# Patient Record
Sex: MALE | Race: WHITE | HISPANIC OR LATINO | Employment: FULL TIME | URBAN - METROPOLITAN AREA
[De-identification: names, ages, dates, MRNs, and addresses within clinical notes are randomized per-mention and may not be internally consistent; named-entity substitution may affect disease eponyms.]

---

## 2017-02-22 ENCOUNTER — APPOINTMENT (OUTPATIENT)
Dept: RADIOLOGY | Facility: MEDICAL CENTER | Age: 42
End: 2017-02-22
Attending: EMERGENCY MEDICINE
Payer: OTHER MISCELLANEOUS

## 2017-02-22 ENCOUNTER — HOSPITAL ENCOUNTER (EMERGENCY)
Facility: MEDICAL CENTER | Age: 42
End: 2017-02-22
Attending: EMERGENCY MEDICINE
Payer: OTHER MISCELLANEOUS

## 2017-02-22 VITALS
OXYGEN SATURATION: 94 % | WEIGHT: 194.22 LBS | RESPIRATION RATE: 16 BRPM | HEART RATE: 60 BPM | TEMPERATURE: 97.8 F | DIASTOLIC BLOOD PRESSURE: 94 MMHG | SYSTOLIC BLOOD PRESSURE: 136 MMHG

## 2017-02-22 DIAGNOSIS — R20.0 LEFT FACIAL NUMBNESS: ICD-10-CM

## 2017-02-22 DIAGNOSIS — R51.9 ACUTE NONINTRACTABLE HEADACHE, UNSPECIFIED HEADACHE TYPE: ICD-10-CM

## 2017-02-22 DIAGNOSIS — R20.2 NUMBNESS AND TINGLING OF LEFT ARM AND LEG: ICD-10-CM

## 2017-02-22 DIAGNOSIS — R20.0 NUMBNESS AND TINGLING OF LEFT ARM AND LEG: ICD-10-CM

## 2017-02-22 LAB
ABO GROUP BLD: NORMAL
ABO GROUP BLD: NORMAL
ALBUMIN SERPL BCP-MCNC: 3.7 G/DL (ref 3.2–4.9)
ALBUMIN/GLOB SERPL: 1 G/DL
ALP SERPL-CCNC: 67 U/L (ref 30–99)
ALT SERPL-CCNC: 38 U/L (ref 2–50)
ANION GAP SERPL CALC-SCNC: 8 MMOL/L (ref 0–11.9)
APTT PPP: 27.1 SEC (ref 24.7–36)
AST SERPL-CCNC: 29 U/L (ref 12–45)
BASOPHILS # BLD AUTO: 0.6 % (ref 0–1.8)
BASOPHILS # BLD: 0.05 K/UL (ref 0–0.12)
BILIRUB SERPL-MCNC: 0.6 MG/DL (ref 0.1–1.5)
BLD GP AB SCN SERPL QL: NORMAL
BUN SERPL-MCNC: 22 MG/DL (ref 8–22)
CALCIUM SERPL-MCNC: 8.7 MG/DL (ref 8.4–10.2)
CHLORIDE SERPL-SCNC: 106 MMOL/L (ref 96–112)
CO2 SERPL-SCNC: 23 MMOL/L (ref 20–33)
CREAT SERPL-MCNC: 0.81 MG/DL (ref 0.5–1.4)
EKG IMPRESSION: NORMAL
EOSINOPHIL # BLD AUTO: 0.18 K/UL (ref 0–0.51)
EOSINOPHIL NFR BLD: 2.3 % (ref 0–6.9)
ERYTHROCYTE [DISTWIDTH] IN BLOOD BY AUTOMATED COUNT: 40 FL (ref 35.9–50)
GFR SERPL CREATININE-BSD FRML MDRD: >60 ML/MIN/1.73 M 2
GLOBULIN SER CALC-MCNC: 3.6 G/DL (ref 1.9–3.5)
GLUCOSE SERPL-MCNC: 105 MG/DL (ref 65–99)
HCT VFR BLD AUTO: 45.4 % (ref 42–52)
HGB BLD-MCNC: 15.9 G/DL (ref 14–18)
IMM GRANULOCYTES # BLD AUTO: 0.03 K/UL (ref 0–0.11)
IMM GRANULOCYTES NFR BLD AUTO: 0.4 % (ref 0–0.9)
INR PPP: 0.98 (ref 0.87–1.13)
LYMPHOCYTES # BLD AUTO: 2.19 K/UL (ref 1–4.8)
LYMPHOCYTES NFR BLD: 27.9 % (ref 22–41)
MCH RBC QN AUTO: 30.8 PG (ref 27–33)
MCHC RBC AUTO-ENTMCNC: 35 G/DL (ref 33.7–35.3)
MCV RBC AUTO: 87.8 FL (ref 81.4–97.8)
MONOCYTES # BLD AUTO: 0.64 K/UL (ref 0–0.85)
MONOCYTES NFR BLD AUTO: 8.1 % (ref 0–13.4)
NEUTROPHILS # BLD AUTO: 4.77 K/UL (ref 1.82–7.42)
NEUTROPHILS NFR BLD: 60.7 % (ref 44–72)
NRBC # BLD AUTO: 0 K/UL
NRBC BLD AUTO-RTO: 0 /100 WBC
PLATELET # BLD AUTO: 245 K/UL (ref 164–446)
PMV BLD AUTO: 9 FL (ref 9–12.9)
POTASSIUM SERPL-SCNC: 3.5 MMOL/L (ref 3.6–5.5)
PROT SERPL-MCNC: 7.3 G/DL (ref 6–8.2)
PROTHROMBIN TIME: 12.8 SEC (ref 12–14.6)
RBC # BLD AUTO: 5.17 M/UL (ref 4.7–6.1)
RH BLD: NORMAL
SODIUM SERPL-SCNC: 137 MMOL/L (ref 135–145)
TROPONIN I SERPL-MCNC: <0.02 NG/ML (ref 0–0.04)
WBC # BLD AUTO: 7.9 K/UL (ref 4.8–10.8)

## 2017-02-22 PROCEDURE — 93005 ELECTROCARDIOGRAM TRACING: CPT

## 2017-02-22 PROCEDURE — 71010 DX-CHEST-PORTABLE (1 VIEW): CPT

## 2017-02-22 PROCEDURE — 70450 CT HEAD/BRAIN W/O DYE: CPT

## 2017-02-22 PROCEDURE — 80053 COMPREHEN METABOLIC PANEL: CPT

## 2017-02-22 PROCEDURE — 70498 CT ANGIOGRAPHY NECK: CPT

## 2017-02-22 PROCEDURE — 93005 ELECTROCARDIOGRAM TRACING: CPT | Performed by: EMERGENCY MEDICINE

## 2017-02-22 PROCEDURE — 99284 EMERGENCY DEPT VISIT MOD MDM: CPT

## 2017-02-22 PROCEDURE — 84484 ASSAY OF TROPONIN QUANT: CPT

## 2017-02-22 PROCEDURE — 70496 CT ANGIOGRAPHY HEAD: CPT

## 2017-02-22 PROCEDURE — 700117 HCHG RX CONTRAST REV CODE 255: Performed by: EMERGENCY MEDICINE

## 2017-02-22 PROCEDURE — 85730 THROMBOPLASTIN TIME PARTIAL: CPT

## 2017-02-22 PROCEDURE — 36415 COLL VENOUS BLD VENIPUNCTURE: CPT

## 2017-02-22 PROCEDURE — 86901 BLOOD TYPING SEROLOGIC RH(D): CPT

## 2017-02-22 PROCEDURE — 94760 N-INVAS EAR/PLS OXIMETRY 1: CPT

## 2017-02-22 PROCEDURE — 86900 BLOOD TYPING SEROLOGIC ABO: CPT

## 2017-02-22 PROCEDURE — 85610 PROTHROMBIN TIME: CPT

## 2017-02-22 PROCEDURE — 86850 RBC ANTIBODY SCREEN: CPT

## 2017-02-22 PROCEDURE — 85025 COMPLETE CBC W/AUTO DIFF WBC: CPT

## 2017-02-22 RX ORDER — BUTALBITAL, ACETAMINOPHEN, CAFFEINE AND CODEINE PHOSPHATE 50; 325; 40; 30 MG/1; MG/1; MG/1; MG/1
1 CAPSULE ORAL EVERY 4 HOURS PRN
Qty: 30 CAP | Refills: 0 | Status: SHIPPED | OUTPATIENT
Start: 2017-02-22

## 2017-02-22 RX ADMIN — IOHEXOL 100 ML: 350 INJECTION, SOLUTION INTRAVENOUS at 19:32

## 2017-02-22 ASSESSMENT — PAIN SCALES - GENERAL: PAINLEVEL_OUTOF10: 0

## 2017-02-22 ASSESSMENT — PAIN SCALES - WONG BAKER: WONGBAKER_NUMERICALRESPONSE: HURTS A LITTLE MORE

## 2017-02-22 NOTE — ED AVS SNAPSHOT
Home Care Instructions                                                                                                                Krishna Goodman   MRN: [id number]    Department:  [facility], Emergency Dept   Date of Visit:  2/22/2017            [facility], Emergency Dept    [address]    [place] [address]    Phone:  [phone]      You were seen by     Bobbi Ying M.D.      Your Diagnosis Was     Numbness and tingling of left arm and leg     R20.2       These are the medications you received during your hospitalization from 02/22/2017 1800 to 02/22/2017 2003     Date/Time Order Dose Route Action    02/22/2017 1932 iohexol (OMNIPAQUE) 350 mg/mL 100 mL Intravenous Given      Follow-up Information     1. Follow up with [facility]. Call in 1 day.    Why:  for recheck    Contact information    [address] [facility] [address] [phone]          2. Follow up with [facility], Emergency Dept. Call in 1 day.    Specialty:  Emergency Medicine    Why:  for recheck    Contact information    [address] [facility] [address] [phone]      Medication Information     Review all of your home medications and newly ordered medications with your primary doctor and/or pharmacist as soon as possible. Follow medication instructions as directed by your doctor and/or pharmacist.     Please keep your complete medication list with you and share with your physician. Update the information when medications are discontinued, doses are changed, or new medications (including over-the-counter products) are added; and carry medication information at all times in the event of emergency situations.               Medication List      START taking these medications        Instructions    butalbital-acetaminophen-caffeine-codeine -50-30 MG per capsule   Commonly known as:  FIORICET W/CODEINE    Take 1 Cap by  mouth every four hours as needed for Headache.   Dose:  1 Cap               Procedures and tests performed during your visit     ABO CONFIRMATION    APTT    CARDIAC MONITORING    CBC WITH DIFFERENTIAL    COD (ADULT)    COMP METABOLIC PANEL    CONSENT FOR CONTRAST INJECTION    CT-CTA HEAD WITH & W/O-POST PROCESS    CT-CTA NECK WITH & W/O-POST PROCESSING    CT-HEAD W/O    DX-CHEST-PORTABLE (1 VIEW)    EKG (ER)    EKG (NOW)    ESTIMATED GFR    IV SALINE LOCK    Oxygen    PROTHROMBIN TIME    Pulse Ox    TROPONIN        Discharge Instructions       Dolor de rainer general sin causa   (General Headache Without Cause)    EL dolor de rainer es un dolor o malestar que se siente en la meet de la rainer o del oz. Puede no tener mercedes causa específica. Hay muchas causas y tipos de luca de rainer. Los más comunes son:   · Cefalea tensional.  · Cefaleas migrañosas.  · Cefalea en brotes.  · Cefaleas diarias crónicas.  INSTRUCCIONES PARA EL CUIDADO EN EL HOGAR   · Cumpla con todas las citas programadas con arango médico o con el especialista al que lo hayan derivado.  · Sólo tome medicamentos de venta vamshi o recetados para calmar el dolor o el malestar, según las indicaciones de arango médico.  · Cuando sienta dolor de rainer acuéstese en un cuarto oscuro y tranquilo.  · Lleve un registro diario para averiguar lo que puede provocarlo. Por ejemplo, escriba:  · Lo que come y morris.  · Cuánto tiempo duerme.  · Todo cambio en la dieta o medicamentos.  · Intente con masajes u otras técnicas de relajación.  · Colóquese compresas de hielo o calor en la rainer y en el oz. Úselos 3 a 4 veces por día de 15 a 20 minutos por vez, o iesha sea necesario.  · Limite las situaciones de estrés.  · Siéntese con la espalda recta y no  tense los músculos.  · Si fuma, deje de hacerlo.  · Limite el consumo de bebidas alcohólicas.  · Consuma menos cantidad de cafeína o deje de tomarla.  · Coma y duerma en horarios regulares.  · Duerma entre 7 y 9 horas o  iesha lo indique arango médico.  · Mantenga las luces tenues si le molestan las luces brillantes y empeoran el dolor de rainer.  SOLICITE ATENCIÓN MÉDICA SI:   · Tiene problemas con los medicamentos que le recetaron.  · Los medicamentos no le hacen efecto.  · El dolor de rainer que sentía habitualmente es diferente.  · Tiene náuseas o vómitos.  SOLICITE ATENCIÓN MÉDICA DE INMEDIATO SI:   · El dolor se hace cada vez más intenso.  · Tiene fiebre.  · Presenta rigidez en el oz.  · Sufre pérdida de la visión.  · Presenta debilidad muscular o pérdida del control muscular.  · Comienza a perder el equilibrio o tiene problemas para caminar.  · Sufre mareos o se desmaya.  · Tiene síntomas graves que son diferentes a los primeros síntomas.     Esta información no tiene iesha fin reemplazar el consejo del médico. Asegúrese de hacerle al médico cualquier pregunta que tenga.     Document Released: 09/27/2006 Document Revised: 05/03/2016  [employer] Interactive Patient Education ©2016 [employer]    Cefalea migrañosa  (Migraine Headache)  Vikki cefalea migrañosa es un dolor muy intenso y punzante en bertram o ambos lados de la rainer. Hable con arango médico sobre los factores que pueden causar (desencadenar) las cefaleas migrañosas.  CUIDADOS EN EL HOGAR  · [place] solo los medicamentos según le haya indicado el médico.  · Cuando tenga la migraña, acuéstese en un cuarto oscuro y tranquilo  · Lleve un registro diario para averiguar si hay ciertas cosas que le provocan la cefalea migrañosa. Por ejemplo, escriba:  ¨ Lo que usted come y morris.  ¨ Cuánto tiempo duerme.  ¨ Algún cambio en arango dieta o en los medicamentos.  · Mojgan menos alcohol.  · Si fuma, deje de hacerlo.  · Duerma lo suficiente.  · Disminuya todo tipo de estrés de la katie diaria.  · Mantenga las luces tenues si le molestan las luces brillantes o hacen que la migraña empeore.  SOLICITE AYUDA DE INMEDIATO SI:   · La migraña empeora.  · Tiene fiebre.  · Presenta rigidez en el  oz.  · Tiene dificultad para kim.  · Roxana músculos están débiles, o pierde el control muscular.  · Pierde el equilibrio o tiene problemas para caminar.  · Siente que se desvanece (debilidad) o se desmaya.  · Tiene malos síntomas que son diferentes a los primeros síntomas.  ASEGÚRESE DE QUE:   · Comprende estas instrucciones.  · Controlará arango afección.  · Recibirá ayuda de inmediato si no mejora o si empeora.     Esta información no tiene iesha fin reemplazar el consejo del médico. Asegúrese de hacerle al médico cualquier pregunta que tenga.     Document Released: 03/16/2010 Document Revised: 12/23/2014  [employer] Interactive Patient Education ©2016 [employer] Inc.            Patient Information     Patient Information    Following emergency treatment: all patient requiring follow-up care must return either to a private physician or a clinic if your condition worsens before you are able to obtain further medical attention, please return to the emergency room.     Billing Information    At [facility], we work to make the billing process streamlined for our patients.  Our Representatives are here to answer any questions you may have regarding your hospital bill.  If you have insurance coverage and have supplied your insurance information to us, we will submit a claim to your insurer on your behalf.  Should you have any questions regarding your bill, we can be reached online or by phone as follows:  Online: You are able pay your bills online or live chat with our representatives about any billing questions you may have. We are here to help Monday - Friday from 8:00am to 7:30pm and 9:00am - 12:00pm on Saturdays.  Please visit [email or web address]  for more information.   Phone:  [phone] or [phone]    Please note that your emergency physician, surgeon, pathologist, radiologist, anesthesiologist, and other specialists are not employed by [facility] and will therefore bill  separately for their services.  Please contact them directly for any questions concerning their bills at the numbers below:     Emergency Physician Services:  [phone]  [facility]:  [phone]  [facility]:  [phone]  [facility]:  [phone]    1. Your final bill may vary from the amount quoted upon discharge if all procedures are not complete at that time, or if your doctor has additional procedures of which we are not aware. You will receive an additional bill if you return to the Emergency Department at [facility] for suture removal regardless of the facility of which the sutures were placed.     2. Please arrange for settlement of this account at the emergency registration.    3. All self-pay accounts are due in full at the time of treatment.  If you are unable to meet this obligation then payment is expected within 4-5 days.     4. If you have had radiology studies (CT, X-ray, Ultrasound, MRI), you have received a preliminary result during your emergency department visit. Please contact the radiology department [phone] to receive a copy of your final result. Please discuss the Final result with your primary physician or with the follow up physician provided.     Crisis Hotline:  [place] Crisis Hotline:  6-234-KNRYSGS or 1-856.784.5380  [place] Crisis Hotline:    1-405.502.5311 or 147-306-7022         ED Discharge Follow Up Questions    1. In order to provide you with very good care, we would like to follow up with a phone call in the next few days.  May we have your permission to contact you?     YES /  NO    2. What is the best phone number to call you? (       )_____-__________    3. What is the best time to call you?      Morning  /  Afternoon  /  Evening                   Patient Signature:  ____________________________________________________________    Date:  ____________________________________________________________

## 2017-02-22 NOTE — ED AVS SNAPSHOT
SimpleTuition Access Code: [email or web address]  Expires: 3/24/2017  8:03 PM    Your email address is not on file at [address].  Email Addresses are required for you to sign up for SimpleTuition, please contact [phone] to verify your personal information and to provide your email address prior to attempting to register for SimpleTuition.    Krishna EricJustyna  [address] [place] [address]    SimpleTuition  A secure, online tool to manage your health information     [facility] [address]’s SimpleTuition® is a secure, online tool that connects you to your personalized health information from the privacy of your home -- day or night - making it very easy for you to manage your healthcare. Once the activation process is completed, you can even access your medical information using the SimpleTuition brandon, which is available for free in the Apple [place] store or Google Play store.     To learn more about SimpleTuition, visit [email or web address]    There are two levels of access available (as shown below):   My Chart Features  [facility] Primary Care Doctor [facility]  Specialists [facility]  Urgent  Care Non-[facility] Primary Care Doctor   Email your healthcare team securely and privately 24/7 X X X    Manage appointments: schedule your next appointment; view details of past/upcoming appointments X      Request prescription refills. X      View recent personal medical records, including lab and immunizations X X X X   View health record, including health history, allergies, medications X X X X   Read reports about your outpatient visits, procedures, consult and ER notes X X X X   See your discharge summary, which is a recap of your hospital and/or ER visit that includes your diagnosis, lab results, and care plan X X  X     How to register for VoterTidet:  Once your e-mail address has been verified, follow the following steps to sign up for VoterTidet.     1. Go to  [email or web address]  2. Click on the Sign Up Now box, which takes you to the New Member Sign Up page. You  will need to provide the following information:  a. Enter your Depop Access Code exactly as it appears at the top of this page. (You will not need to use this code after you’ve completed the sign-up process. If you do not sign up before the expiration date, you must request a new code.)   b. Enter your date of birth.   c. Enter your home email address.   d. Click Submit, and follow the next screen’s instructions.  3. Create a VoloMetrixt ID. This will be your Depop login ID and cannot be changed, so think of one that is secure and easy to remember.  4. Create a Depop password. You can change your password at any time.  5. Enter your Password Reset Question and Answer. This can be used at a later time if you forget your password.   6. Enter your e-mail address. This allows you to receive e-mail notifications when new information is available in Depop.  7. Click Sign Up. You can now view your health information.    For assistance activating your Depop account, call [phone]

## 2017-02-22 NOTE — ED AVS SNAPSHOT
2/22/2017          Krishna Goodman  [address] [facility] [address]    Dear Krishna:    [facility] wants to ensure your discharge home is safe and you or your loved ones have had all your questions answered regarding your care after you leave the hospital.    You may receive a telephone call within two days of your discharge.  This call is to make certain you understand your discharge instructions as well as ensure we provided you with the best care possible during your stay with us.     The call will only last approximately 3-5 minutes and will be done by a nurse.    Once again, we want to ensure your discharge home is safe and that you have a clear understanding of any next steps in your care.  If you have any questions or concerns, please do not hesitate to contact us, we are here for you.  Thank you for choosing [facility] for your healthcare needs.    Sincerely,    Gerson García    [facility]

## 2017-02-23 NOTE — ED NOTES
Discharged in good condition after discharge instructions reviewed with pt in detail, pt verbalizes understanding of all. Ambulated out with steady gait accompanied by family with follow up instructions in hand. Instructed pt to not drive or drink alcohol while taking fioricet, pt states understanding.

## 2017-02-23 NOTE — ED PROVIDER NOTES
ED Provider Note  CHIEF COMPLAINT  Chief Complaint   Patient presents with   • Chest Pain     since Sunday   • Tingling     Left side started Sunday       HPI  Krishna Goodman is a 41 y.o. male who presents in the care of his daughters complaining of left-sided tingling to his face, arm and leg that started on Sunday and has continued throughout the last few days. He states that today he had increased pain in the back of his head as well as the numbness. He is also feeling very weak and dizzy and had a little bit of chest pain as well. He denies any shortness of breath, productive cough, nausea or vomiting. He denies any leg swelling. He has not had any slurred speech. According to his daughters, and he has not had any difficult with walking or driving. He has never had symptoms like this in the past. He denies any falls or head trauma. Nothing seems to make his symptoms better or worse. He denies any double vision. He has no fevers, pain or sinus pressure. He denies any neck stiffness. The patient has no history of hypertension. He does smoke occasionally. He is not diabetic. He has never had any surgery. Does not take medications on a daily basis.    REVIEW OF SYSTEMS     HEENT:  No ear pain, congestion or sore throat   EYES: no discharge redness or vision changes  CARDIAC: Positive chest pain, no palpitations    PULMONARY: no dyspnea, cough or congestion   GI: no vomiting diarrhea or abdominal pain   : no dysuria, back pain or hematuria   Neuro: See history of present illness  Musculoskeletal: no swelling deformity or pain no joint swelling  Endocrine: no fevers, sweating, weight loss   SKIN: no rash, erythema or contusions     See history of present illness all other systems are negative      PAST MEDICAL HISTORY  History reviewed. No pertinent past medical history.    FAMILY HISTORY  History reviewed. No pertinent family history.    SOCIAL HISTORY  Social History     Social History   • Marital Status:       Spouse Name: N/A   • Number of Children: N/A   • Years of Education: N/A     Social History Main Topics   • Smoking status: Current Some Day Smoker   • Smokeless tobacco: None   • Alcohol Use: Yes   • Drug Use: No   • Sexual Activity: Not Asked     Other Topics Concern   • None     Social History Narrative   • None       SURGICAL HISTORY  History reviewed. No pertinent past surgical history.    CURRENT MEDICATIONS  No current facility-administered medications on file prior to encounter.     No current outpatient prescriptions on file prior to encounter.       ALLERGIES  No Known Allergies    PHYSICAL EXAM  VITAL SIGNS: /94 mmHg  Pulse 60  Temp(Src) 36.6 °C (97.8 °F)  Resp 16  Wt 88.1 kg (194 lb 3.6 oz)  SpO2 94% Room air O2: 93    Constitutional: Well developed, Well nourished, No acute distress, Non-toxic appearance.   HENT: Cephalic, atraumatic. Positive left-sided facial numbness without any weakness. No drooling or trismus. No flattening of the nasolabial fold  Eyes: Pupils are equal, round, reactive to light. Extra muscles are intact  Neck: Normal range of motion, No tenderness, Supple, No stridor.   Cardiovascular: Normal heart rate, Normal rhythm, No murmurs, No rubs, No gallops.   Thorax & Lungs: Normal breath sounds, No respiratory distress, No wheezing, No chest tenderness.   Abdomen: Bowel sounds normal, Soft, No tenderness, No masses, No pulsatile masses.   Skin: Warm, Dry, No erythema, No rash.   Back: No tenderness, No CVA tenderness.   Extremities: Intact distal pulses, No edema, No tenderness, No cyanosis, No clubbing.   Neurologic: Positive decreased sensation on the left face, arm and leg. 5 out of 5 strength upper and lower extremities bilaterally. Normal speech  Psychiatric: Affect normal, Judgment normal, Mood normal.     EKG Interpretation    Interpreted by me    Rhythm: normal sinus   Rate: normal  Axis: normal  Ectopy: none  Conduction: normal  ST Segments: no acute  change  T Waves: no acute change  Q Waves: none    Clinical Impression: no acute changes and normal EKG    RADIOLOGY/PROCEDURES  CT-CTA HEAD WITH & W/O-POST PROCESS   Final Result      No intracranial aneurysm, focal stenosis, or abrupt vessel cut off.      CT-CTA NECK WITH & W/O-POST PROCESSING   Final Result      CT angiogram of the neck within normal limits.      DX-CHEST-PORTABLE (1 VIEW)   Final Result         No acute cardiac or pulmonary abnormality is identified.      CT-HEAD W/O   Final Result         NO ACUTE ABNORMALITIES ARE NOTED ON CT SCAN OF THE HEAD.               COURSE & MEDICAL DECISION MAKING  Pertinent Labs & Imaging studies reviewed. (See chart for details)  Differential diagnosis: CVA, TIA, aneurysm, electrolyte disturbance    Results for orders placed or performed during the hospital encounter of 02/22/17   CBC WITH DIFFERENTIAL   Result Value Ref Range    WBC 7.9 4.8 - 10.8 K/uL    RBC 5.17 4.70 - 6.10 M/uL    Hemoglobin 15.9 14.0 - 18.0 g/dL    Hematocrit 45.4 42.0 - 52.0 %    MCV 87.8 81.4 - 97.8 fL    MCH 30.8 27.0 - 33.0 pg    MCHC 35.0 33.7 - 35.3 g/dL    RDW 40.0 35.9 - 50.0 fL    Platelet Count 245 164 - 446 K/uL    MPV 9.0 9.0 - 12.9 fL    Neutrophils-Polys 60.70 44.00 - 72.00 %    Lymphocytes 27.90 22.00 - 41.00 %    Monocytes 8.10 0.00 - 13.40 %    Eosinophils 2.30 0.00 - 6.90 %    Basophils 0.60 0.00 - 1.80 %    Immature Granulocytes 0.40 0.00 - 0.90 %    Nucleated RBC 0.00 /100 WBC    Neutrophils (Absolute) 4.77 1.82 - 7.42 K/uL    Lymphs (Absolute) 2.19 1.00 - 4.80 K/uL    Monos (Absolute) 0.64 0.00 - 0.85 K/uL    Eos (Absolute) 0.18 0.00 - 0.51 K/uL    Baso (Absolute) 0.05 0.00 - 0.12 K/uL    Immature Granulocytes (abs) 0.03 0.00 - 0.11 K/uL    NRBC (Absolute) 0.00 K/uL   COMP METABOLIC PANEL   Result Value Ref Range    Sodium 137 135 - 145 mmol/L    Potassium 3.5 (L) 3.6 - 5.5 mmol/L    Chloride 106 96 - 112 mmol/L    Co2 23 20 - 33 mmol/L    Anion Gap 8.0 0.0 - 11.9     Glucose 105 (H) 65 - 99 mg/dL    Bun 22 8 - 22 mg/dL    Creatinine 0.81 0.50 - 1.40 mg/dL    Calcium 8.7 8.4 - 10.2 mg/dL    AST(SGOT) 29 12 - 45 U/L    ALT(SGPT) 38 2 - 50 U/L    Alkaline Phosphatase 67 30 - 99 U/L    Total Bilirubin 0.6 0.1 - 1.5 mg/dL    Albumin 3.7 3.2 - 4.9 g/dL    Total Protein 7.3 6.0 - 8.2 g/dL    Globulin 3.6 (H) 1.9 - 3.5 g/dL    A-G Ratio 1.0 g/dL   PROTHROMBIN TIME   Result Value Ref Range    PT 12.8 12.0 - 14.6 sec    INR 0.98 0.87 - 1.13   APTT   Result Value Ref Range    APTT 27.1 24.7 - 36.0 sec   COD (ADULT)   Result Value Ref Range    ABO Grouping Only O     Rh Grouping Only POS     Antibody Screen-Cod NEG    TROPONIN   Result Value Ref Range    Troponin I <0.02 0.00 - 0.04 ng/mL   ESTIMATED GFR   Result Value Ref Range    GFR If African American >60 >60 mL/min/1.73 m 2    GFR If Non African American >60 >60 mL/min/1.73 m 2   ABO CONFIRMATION   Result Value Ref Range    Second ABO Typing With Cod O    EKG (ER)   Result Value Ref Range    Report       [facility] Emergency Dept.    Test Date:  2017  Pt Name:    JOSH WHALEN      Department: [facility]  MRN:        [id number]                      Room:  Gender:     M                            Technician: INOCENCIO  :        1975                   Requested By:ER TRIAGE PROTOCOL  Order #:    [id number]                    Reading MD:    Measurements  Intervals                                Axis  Rate:       71                           P:          39  AR:         160                          QRS:        17  QRSD:       88                           T:          13  QT:         380  QTc:        413    Interpretive Statements  SINUS RHYTHM  No previous ECG available for comparison            7:50 PM he spoke with the patient and family informed him that so far his workup was negative. The patient states he does have a primary care physician to follow up with and does not want to stay in the hospital for  further workup. He is to rest and keep her follow-up appointment with his primary care physician for recheck. He understands to return to [facility] for any weakness, trouble with speech, falls or worsening symptoms.    [facility]  [address] [facility] [address]  [phone]    Call in 1 day  for recheck    [facility], Emergency Dept  [address] [facility] [address] [phone]  Call in 1 day  for recheck    Current Outpatient Prescriptions   Medication Sig Dispense Refill   • butalbital-acetaminophen-caffeine-codeine (FIORICET W/CODEINE) -50-30 MG per capsule Take 1 Cap by mouth every four hours as needed for Headache. 30 Cap 0           FINAL IMPRESSION  1. Numbness and tingling of left arm and leg    2. Left facial numbness    3. Acute nonintractable headache, unspecified headache type            Electronically signed by: Bobbi Ying, 2/22/2017 6:49 PM

## 2017-02-23 NOTE — ED NOTES
1830 Assessment done  1845 NSL initiated Blood to lab  1855 2nd ABO sent POC discussed with pt and family  1902 To CT scan

## 2017-02-23 NOTE — DISCHARGE INSTRUCTIONS
Dolor de rainer general sin causa   (General Headache Without Cause)    EL dolor de rainer es un dolor o malestar que se siente en la meet de la rainer o del oz. Puede no tener mercedes causa específica. Hay muchas causas y tipos de luca de rainer. Los más comunes son:   · Cefalea tensional.  · Cefaleas migrañosas.  · Cefalea en brotes.  · Cefaleas diarias crónicas.  INSTRUCCIONES PARA EL CUIDADO EN EL HOGAR   · Cumpla con todas las citas programadas con arango médico o con el especialista al que lo hayan derivado.  · Sólo tome medicamentos de venta vamshi o recetados para calmar el dolor o el malestar, según las indicaciones de arango médico.  · Cuando sienta dolor de rainer acuéstese en un cuarto oscuro y tranquilo.  · Lleve un registro diario para averiguar lo que puede provocarlo. Por ejemplo, escriba:  · Lo que come y morris.  · Cuánto tiempo duerme.  · Todo cambio en la dieta o medicamentos.  · Intente con masajes u otras técnicas de relajación.  · Colóquese compresas de hielo o calor en la rainer y en el oz. Úselos 3 a 4 veces por día de 15 a 20 minutos por vez, o iesha sea necesario.  · Limite las situaciones de estrés.  · Siéntese con la espalda recta y no  tense los músculos.  · Si fuma, deje de hacerlo.  · Limite el consumo de bebidas alcohólicas.  · Consuma menos cantidad de cafeína o deje de tomarla.  · Coma y duerma en horarios regulares.  · Duerma entre 7 y 9 horas o iesha lo indique arango médico.  · Mantenga las luces tenues si le molestan las luces brillantes y empeoran el dolor de rainer.  SOLICITE ATENCIÓN MÉDICA SI:   · Tiene problemas con los medicamentos que le recetaron.  · Los medicamentos no le hacen efecto.  · El dolor de rainer que sentía habitualmente es diferente.  · Tiene náuseas o vómitos.  SOLICITE ATENCIÓN MÉDICA DE INMEDIATO SI:   · El dolor se hace cada vez más intenso.  · Tiene fiebre.  · Presenta rigidez en el oz.  · Sufre pérdida de la visión.  · Presenta debilidad muscular o pérdida  del control muscular.  · Comienza a perder el equilibrio o tiene problemas para caminar.  · Sufre mareos o se desmaya.  · Tiene síntomas graves que son diferentes a los primeros síntomas.     Esta información no tiene iesha fin reemplazar el consejo del médico. Asegúrese de hacerle al médico cualquier pregunta que tenga.     Document Released: 09/27/2006 Document Revised: 05/03/2016  [employer] Patient Education ©2016 Elsevier Inc.    Cefalea migrañosa  (Migraine Headache)  Vikki cefalea migrañosa es un dolor muy intenso y punzante en bertram o ambos lados de la rainer. Hable con arango médico sobre los factores que pueden causar (desencadenar) las cefaleas migrañosas.  CUIDADOS EN EL HOGAR  · [place] solo los medicamentos según le haya indicado el médico.  · Cuando tenga la migraña, acuéstese en un cuarto oscuro y tranquilo  · Lleve un registro diario para averiguar si hay ciertas cosas que le provocan la cefalea migrañosa. Por ejemplo, escriba:  ¨ Lo que usted come y morirs.  ¨ Cuánto tiempo duerme.  ¨ Algún cambio en arango dieta o en los medicamentos.  · Mojgan menos alcohol.  · Si fuma, deje de hacerlo.  · Duerma lo suficiente.  · Disminuya todo tipo de estrés de la katie diaria.  · Mantenga las luces tenues si le molestan las luces brillantes o hacen que la migraña empeore.  SOLICITE AYUDA DE INMEDIATO SI:   · La migraña empeora.  · Tiene fiebre.  · Presenta rigidez en el oz.  · Tiene dificultad para kim.  · Roxana músculos están débiles, o pierde el control muscular.  · Pierde el equilibrio o tiene problemas para caminar.  · Siente que se desvanece (debilidad) o se desmaya.  · Tiene malos síntomas que son diferentes a los primeros síntomas.  ASEGÚRESE DE QUE:   · Comprende estas instrucciones.  · Controlará arango afección.  · Recibirá ayuda de inmediato si no mejora o si empeora.     Esta información no tiene iesha fin reemplazar el consejo del médico. Asegúrese de hacerle al médico cualquier pregunta que tenga.     Document  Released: 03/16/2010 Document Revised: 12/23/2014  Elsevier Interactive Patient Education ©2016 Elsevier Inc.

## 2017-02-23 NOTE — ED NOTES
Chief Complaint   Patient presents with   • Chest Pain     since Sunday   • Tingling     Left side started Sunday     /84 mmHg  Pulse 70  Temp(Src) 36.9 °C (98.5 °F)  Resp 16  Wt 88.1 kg (194 lb 3.6 oz)  SpO2 93%    Pt A&O x 4, no facial droop noted.   equal and strong

## 2017-03-07 LAB — EKG IMPRESSION: NORMAL

## 2017-05-08 ENCOUNTER — OFFICE VISIT (OUTPATIENT)
Dept: CARDIOLOGY | Facility: MEDICAL CENTER | Age: 42
End: 2017-05-08
Payer: OTHER MISCELLANEOUS

## 2017-05-08 VITALS
WEIGHT: 191 LBS | SYSTOLIC BLOOD PRESSURE: 100 MMHG | OXYGEN SATURATION: 97 % | DIASTOLIC BLOOD PRESSURE: 60 MMHG | HEART RATE: 56 BPM

## 2017-05-08 DIAGNOSIS — Z72.0 TOBACCO ABUSE: ICD-10-CM

## 2017-05-08 DIAGNOSIS — R07.9 CHEST PAIN, UNSPECIFIED TYPE: ICD-10-CM

## 2017-05-08 LAB — EKG IMPRESSION: NORMAL

## 2017-05-08 PROCEDURE — 93018 CV STRESS TEST I&R ONLY: CPT | Performed by: INTERNAL MEDICINE

## 2017-05-08 PROCEDURE — 93000 ELECTROCARDIOGRAM COMPLETE: CPT | Performed by: INTERNAL MEDICINE

## 2017-05-08 PROCEDURE — 99204 OFFICE O/P NEW MOD 45 MIN: CPT | Performed by: INTERNAL MEDICINE

## 2017-05-08 NOTE — MR AVS SNAPSHOT
Krishna BrendaRoel   2017 8:40 AM   Office Visit   MRN: [id number]    Department:  [facility]   Dept Phone:  [phone]    Description:  Male : 1975   Provider:  Willian Carbone M.D.           Reason for Visit     New Patient           Allergies as of 2017     No Known Allergies      You were diagnosed with     Chest pain, unspecified type   [3867054]       Tobacco abuse   [[id number]]         Vital Signs     Blood Pressure Pulse Weight Oxygen Saturation Smoking Status       100/60 mmHg 56 86.637 kg (191 lb) 97% Current Some Day Smoker       Basic Information     Date Of Birth Sex Race Ethnicity Preferred Language Language for Written Material    1975 Male White  Origin (Indonesian,Nepalese,Sammarinese,Alex, etc) Indonesian Indonesian      Your appointments     May 16, 2017  7:45 AM   Treadmill with [facility]   [facility] (--)    [address], [address]  [place] [id number] [phone]              Health Maintenance        Date Due Completion Dates    IMM DTaP/Tdap/Td Vaccine (1 - Tdap) 1992 ---            Results     EKG      Component    Report    [facility] [place] [facility]    Test Date:  2017  Pt Name:    KRISHNA BRASHER      Department: [facility]  MRN:        [id number]                      Room:  Gender:     M                            Technician: JOE  :        1975                   Requested By:WILLIAN CARBONE  Order #:    [id number]                    Reading MD: Willian Crabone MD    Measurements  Intervals                                Axis  Rate:       55                           P:          64  LA:         164                          QRS:        61  QRSD:       94                           T:          38  QT:         404  QTc:        387    Interpretive Statements  SINUS BRADYCARDIA  BORDERLINE INFERIOR Q WAVES  Compared to ECG 2017 19:26:26  Sinus rhythm no longer present  Left-axis deviation no  longer present    Electronically Signed On 5-8-2017 9:01:02 PDT by Willian Blanco MD                          Current Immunizations     No immunizations on file.      Below and/or attached are the medications your provider expects you to take. Review all of your home medications and newly ordered medications with your provider and/or pharmacist. Follow medication instructions as directed by your provider and/or pharmacist. Please keep your medication list with you and share with your provider. Update the information when medications are discontinued, doses are changed, or new medications (including over-the-counter products) are added; and carry medication information at all times in the event of emergency situations     Allergies:  No Known Allergies          Medications  Valid as of: May 08, 2017 -  9:06 AM    Generic Name Brand Name Tablet Size Instructions for use    Butalbital-APAP-Caff-Cod (Cap) FIORICET W/CODEINE -81-30 MG Take 1 Cap by mouth every four hours as needed for Headache.        .                 Medicines prescribed today were sent to:     None      Medication refill instructions:       If your prescription bottle indicates you have medication refills left, it is not necessary to call your provider’s office. Please contact your pharmacy and they will refill your medication.    If your prescription bottle indicates you do not have any refills left, you may request refills at any time through one of the following ways: The online [facility] system (except Urgent Care), by calling your provider’s office, or by asking your pharmacy to contact your provider’s office with a refill request. Medication refills are processed only during regular business hours and may not be available until the next business day. Your provider may request additional information or to have a follow-up visit with you prior to refilling your medication.   *Please Note: Medication refills are assigned a new Rx number when  refilled electronically. Your pharmacy may indicate that no refills were authorized even though a new prescription for the same medication is available at the pharmacy. Please request the medicine by name with the pharmacy before contacting your provider for a refill.        Your To Do List     Future Labs/Procedures Complete By Expires    CARDIAC STRESS TEST [facility] ONLY  As directed 5/8/2018         [email or web address] Access Code: [email or web address]  Expires: 6/7/2017  9:06 AM    [email or web address]com  A secure, online tool to manage your health information     [employer]s Local[employer]® is a secure, online tool that connects you to your personalized health information from the privacy of your home -- day or night - making it very easy for you to manage your healthcare. Once the activation process is completed, you can even access your medical information using the Local[employer]com brandon, which is available for free in the [employer] store or Google Play store.     [email or web address]com provides the following levels of access (as shown below):   My Chart Features   [facility] Primary Care Doctor [facility]  Specialists [facility]  Urgent  Care Non-[facility]  Primary Care  Doctor   Email your healthcare team securely and privately 24/7 X X X    Manage appointments: schedule your next appointment; view details of past/upcoming appointments X      Request prescription refills. X      View recent personal medical records, including lab and immunizations X X X X   View health record, including health history, allergies, medications X X X X   Read reports about your outpatient visits, procedures, consult and ER notes X X X X   See your discharge summary, which is a recap of your hospital and/or ER visit that includes your diagnosis, lab results, and care plan. X X       How to register for [email or web address]:  1. Go to  [email or web address].  2. Click on the Sign Up Now box, which takes you to the New Member Sign Up page. You will need to provide the following information:  a. Enter your  [employer] Access Code exactly as it appears at the top of this page. (You will not need to use this code after you’ve completed the sign-up process. If you do not sign up before the expiration date, you must request a new code.)   b. Enter your date of birth.   c. Enter your home email address.   d. Click Submit, and follow the next screen’s instructions.  3. Create a [employer] ID. This will be your [employer] login ID and cannot be changed, so think of one that is secure and easy to remember.  4. Create a [employer] password. You can change your password at any time.  5. Enter your Password Reset Question and Answer. This can be used at a later time if you forget your password.   6. Enter your e-mail address. This allows you to receive e-mail notifications when new information is available in [employer].  7. Click Sign Up. You can now view your health information.    For assistance activating your [employer] account, call [phone]        Quit Tobacco Information     Do you want to quit using tobacco?    Quitting tobacco decreases risks of cancer, heart and lung disease, increases life expectancy, improves sense of taste and smell, and increases spending money, among other benefits.    If you are thinking about quitting, we can help.  • [facility] Quit Tobacco Program: 738.983.3683  o Program occurs weekly for four weeks and includes pharmacist consultation on products to support quitting smoking or chewing tobacco. A provider referral is needed for pharmacist consultation.  • Tobacco Users Help Hotline: 6-856-QUIT-NOW (906-5452) or https://[place].quitlogix.org/  o Free, confidential telephone and online coaching for [place] residents. Sessions are designed on a schedule that is convenient for you. Eligible clients receive free nicotine replacement therapy.  • Nationally: www.smokefree.gov  o Information and professional assistance to support both immediate and long-term needs as you become, and remain, a non-smoker. Smokefree.gov  allows you to choose the help that best fits your needs.

## 2017-05-08 NOTE — PROGRESS NOTES
"Arrhythmia Clinic Note (New patient)     DOS: 5/8/2017    Referring physician: Bobbi Ying    Chief complaint/Reason for consult: CP    HPI:  Patient is a 43 yo male with history of tobacco abuse who recently was seen in the ED for headache and arm tingling. CP was associated with this. He said the pain was \"poking\" him over the left chest. Constant for 2-3 days. Non-exertional. 6/10 in severity. Since resolved.    ROS (+ highlighted in red):  Constitutional: Fevers/chills/fatigue/weightloss  HEENT: Blurry vision/eye pain/sore throat/hearing loss  Respiratory: Shortness of breath/cough  Cardiovascular: Chest pain/palpitations/edema/orthopnea/syncope  GI: Nausea/vomitting/diarrhea  MSK: Arthralgias/myagias/muscle weakness  Skin: Rash/sores  Neurological: Numbness/tremors/vertigo  Endocrine: Excessive thirst/polyuria/cold intolerance/heat intolerance  Psych: Depression/anxiety    History reviewed. No pertinent past medical history.    History reviewed. No pertinent past surgical history.    Social History     Social History   • Marital Status:      Spouse Name: N/A   • Number of Children: N/A   • Years of Education: N/A     Occupational History   • Not on file.     Social History Main Topics   • Smoking status: Current Some Day Smoker     Types: Cigarettes   • Smokeless tobacco: Not on file   • Alcohol Use: Yes   • Drug Use: No   • Sexual Activity: Not on file     Other Topics Concern   • Not on file     Social History Narrative       History reviewed. No pertinent family history.    No Known Allergies    Current Outpatient Prescriptions   Medication Sig Dispense Refill   • butalbital-acetaminophen-caffeine-codeine (FIORICET W/CODEINE) -46-30 MG per capsule Take 1 Cap by mouth every four hours as needed for Headache. 30 Cap 0     No current facility-administered medications for this visit.       Physical Exam:  Filed Vitals:    05/08/17 0829   BP: 100/60   Pulse: 56   Weight: 86.637 kg (191 lb)   SpO2: " 97%     General appearance: NAD, conversant   Eyes: anicteric sclerae, moist conjunctivae; no lid-lag; PERRLA  HENT: Atraumatic; oropharynx clear with moist mucous membranes and no mucosal ulcerations; normal hard and soft palate  Neck: Trachea midline; FROM, supple, no thyromegaly or lymphadenopathy  Lungs: CTA, with normal respiratory effort and no intercostal retractions  CV: RRR, no MRGs, no JVD   Abdomen: Soft, non-tender; no masses or HSM  Extremities: No peripheral edema or extremity lymphadenopathy  Skin: Normal temperature, turgor and texture; no rash, ulcers or subcutaneous nodules  Psych: Appropriate affect, alert and oriented to person, place and time    Data:  Labs reviewed    CXR WNL    EKG interpreted by me:  NSR    Impression/Plan:  1. Chest pain, unspecified type  EKG    CARDIAC STRESS TEST [facility] ONLY   2. Tobacco abuse       -Counseled him on smoking cessation  -ETT, f/u necessary only if abnormal    [place] MD

## 2017-05-16 ENCOUNTER — NON-PROVIDER VISIT (OUTPATIENT)
Dept: CARDIOLOGY | Facility: MEDICAL CENTER | Age: 42
End: 2017-05-16
Attending: INTERNAL MEDICINE
Payer: OTHER MISCELLANEOUS

## 2017-05-16 VITALS
HEART RATE: 55 BPM | HEIGHT: 67 IN | WEIGHT: 190 LBS | BODY MASS INDEX: 29.82 KG/M2 | DIASTOLIC BLOOD PRESSURE: 80 MMHG | SYSTOLIC BLOOD PRESSURE: 110 MMHG | OXYGEN SATURATION: 96 %

## 2017-05-16 DIAGNOSIS — R07.9 CHEST PAIN, UNSPECIFIED TYPE: ICD-10-CM

## 2017-05-16 LAB — TREADMILL STRESS: NORMAL

## 2017-05-16 PROCEDURE — 93015 CV STRESS TEST SUPVJ I&R: CPT | Performed by: INTERNAL MEDICINE

## 2017-05-18 ENCOUNTER — TELEPHONE (OUTPATIENT)
Dept: CARDIOLOGY | Facility: MEDICAL CENTER | Age: 42
End: 2017-05-18

## 2017-05-18 NOTE — TELEPHONE ENCOUNTER
----- Message from Willian Blanco M.D. sent at 5/17/2017  9:39 PM PDT -----  Good news, his stress test is normal. No evidence of ischemia. No follow-up needed.

## 2021-04-14 ENCOUNTER — IMMUNIZATION (OUTPATIENT)
Dept: FAMILY PLANNING/WOMEN'S HEALTH CLINIC | Facility: IMMUNIZATION CENTER | Age: 46
End: 2021-04-14
Payer: OTHER MISCELLANEOUS

## 2021-04-14 DIAGNOSIS — Z23 ENCOUNTER FOR VACCINATION: Primary | ICD-10-CM

## 2021-04-14 PROCEDURE — 91300 PFIZER SARS-COV-2 VACCINE: CPT | Performed by: INTERNAL MEDICINE

## 2021-04-14 PROCEDURE — 0001A PFIZER SARS-COV-2 VACCINE: CPT | Performed by: INTERNAL MEDICINE

## 2021-05-06 ENCOUNTER — IMMUNIZATION (OUTPATIENT)
Dept: FAMILY PLANNING/WOMEN'S HEALTH CLINIC | Facility: IMMUNIZATION CENTER | Age: 46
End: 2021-05-06
Payer: OTHER MISCELLANEOUS

## 2021-05-06 DIAGNOSIS — Z23 ENCOUNTER FOR VACCINATION: Primary | ICD-10-CM

## 2021-05-06 PROCEDURE — 91300 PFIZER SARS-COV-2 VACCINE: CPT | Performed by: INTERNAL MEDICINE

## 2021-05-06 PROCEDURE — 0002A PFIZER SARS-COV-2 VACCINE: CPT | Performed by: INTERNAL MEDICINE

## 2024-11-01 ENCOUNTER — HOSPITAL ENCOUNTER (EMERGENCY)
Facility: MEDICAL CENTER | Age: 49
End: 2024-11-01
Attending: STUDENT IN AN ORGANIZED HEALTH CARE EDUCATION/TRAINING PROGRAM
Payer: OTHER MISCELLANEOUS

## 2024-11-01 VITALS
SYSTOLIC BLOOD PRESSURE: 154 MMHG | RESPIRATION RATE: 18 BRPM | BODY MASS INDEX: 30.8 KG/M2 | WEIGHT: 196.21 LBS | TEMPERATURE: 97.8 F | OXYGEN SATURATION: 94 % | HEART RATE: 63 BPM | DIASTOLIC BLOOD PRESSURE: 89 MMHG | HEIGHT: 67 IN

## 2024-11-01 DIAGNOSIS — H10.211 CHEMICAL CONJUNCTIVITIS OF RIGHT EYE: ICD-10-CM

## 2024-11-01 PROCEDURE — 99283 EMERGENCY DEPT VISIT LOW MDM: CPT

## 2024-11-01 RX ORDER — ERYTHROMYCIN 5 MG/G
1 OINTMENT OPHTHALMIC 2 TIMES DAILY
Qty: 3.5 G | Refills: 0 | Status: ACTIVE | OUTPATIENT
Start: 2024-11-01 | End: 2024-11-06

## 2024-11-01 RX ORDER — PROPARACAINE HYDROCHLORIDE 5 MG/ML
1 SOLUTION/ DROPS OPHTHALMIC ONCE
Status: DISCONTINUED | OUTPATIENT
Start: 2024-11-01 | End: 2024-11-01 | Stop reason: HOSPADM

## 2024-11-01 ASSESSMENT — PAIN DESCRIPTION - PAIN TYPE
TYPE: ACUTE PAIN
TYPE: ACUTE PAIN

## 2024-11-01 NOTE — ED PROVIDER NOTES
Patient CHIEF COMPLAINT  Chief Complaint   Patient presents with    Eye Pain     Rt eye        LIMITATION TO HISTORY   Select: None    HPI    Krishna Goodman is a 49 y.o. male who presents to the Emergency Department as a transfer from [facility] after accidentally splashing a  in his eye around 8 PM last night.  He was seen at [facility], where he was noted to have elevated intraocular pressures measuring 30 and subsequently 50.  He was also noted to have an elevated pH in the eye measuring 8.  He was copiously irrigated prior to sending.  Then was sent here for further care and evaluation.    OUTSIDE HISTORIAN(S):  Select: Family at bedside states that the patient splashed  in his eye around 8 PM    EXTERNAL RECORDS REVIEWED  Select: Other reviewed note from [facility] was seen after splashing  in his eye was noted to have elevated intraocular pressures of 30 and 50 on the right      PAST MEDICAL HISTORY  History reviewed. No pertinent past medical history.  .    SURGICAL HISTORY  History reviewed. No pertinent surgical history.      FAMILY HISTORY  History reviewed. No pertinent family history.       SOCIAL HISTORY  Social History     Socioeconomic History    Marital status:      Spouse name: Not on file    Number of children: Not on file    Years of education: Not on file    Highest education level: Not on file   Occupational History    Not on file   Tobacco Use    Smoking status: Some Days     Types: Cigarettes    Smokeless tobacco: Not on file   Substance and Sexual Activity    Alcohol use: Yes    Drug use: No    Sexual activity: Not on file   Other Topics Concern    Not on file   Social History Narrative    Not on file     Social Determinants of Health     Financial Resource Strain: Not on file   Food Insecurity: Not on file   Transportation Needs: Not on file   Physical Activity: Not on file   Stress: Not on file  "  Social Connections: Not on file   Intimate Partner Violence: Not on file   Housing Stability: Not on file         CURRENT MEDICATIONS  No current facility-administered medications on file prior to encounter.     Current Outpatient Medications on File Prior to Encounter   Medication Sig Dispense Refill    butalbital-acetaminophen-caffeine-codeine (FIORICET W/CODEINE) -26-30 MG per capsule Take 1 Cap by mouth every four hours as needed for Headache. 30 Cap 0           ALLERGIES  No Known Allergies    PHYSICAL EXAM  VITAL SIGNS:BP (!) 157/88   Pulse 60   Temp 36.3 °C (97.4 °F)   Resp 16   Ht 1.702 m (5' 7\")   Wt 89 kg (196 lb 3.4 oz)   SpO2 97%   BMI 30.73 kg/m²       VITALS - vital signs documented prior to this note have been reviewed and noted,  see EHR  GENERAL - awake and alert, no acute distress  HEENT - normocephalic, atraumatic, moist mucus membranes conjunctivitis of the right eye visual acuities are 20/20 bilaterally 20/25 right 20/20 left without correction, intraocular pressure on the right is 12/13/12 on the left was 12/11/13 pH in the emergency department was 7  CARDIOVASCULAR - regular rate and rhythm  PULMONARY - unlabored, no respiratory distress. No audible wheezing or  stridor.  NEUROLOGIC - mental status normal, speech fluid, cognition normal  MUSCULOSKELETAL -no obvious deformity or swelling  DERMATOLOGIC - warm and dry, no visible rashes  PSYCHIATRIC - normal affect, normal concentration      DIAGNOSTIC STUDIES / PROCEDURES        Radiologist interpretation:   No orders to display        COURSE & MEDICAL DECISION MAKING    ED COURSE:    ED Observation Status? no    INTERVENTIONS BY ME:  Medications   proparacaine (Opthaine) 0.5 % ophthalmic solution 1 Drop (has no administration in time range)       Response on recheck: Recheck pH after eye irrigation, normal at 7,          INITIAL ASSESSMENT, COURSE AND PLAN  Care Narrative: Patient presented as a transfer from [facility] after being seen for a chemical conjunctivitis after splashing  in his right eye.  He was noted to have a elevated intraocular pressures at the outside facility as well as a pH of 8.  Was irrigated with reportedly 3 L of saline, and transferred here given the elevated intraocular pressures.  Upon my assessment the patient does have a conjunctivitis of the right eye, visual acuities are 2025 right 20/25 left 20/25 bilaterally.  Intraocular pressures are repeated here in the emergency department and several measurements are all within normal limits.  pH also appears to have normalized at 7 did irrigate the eye with an additional 1 L of normal saline, recheck the pH and it was normal.  This point given that his pressures have come down, his pH is normal, do believe he is appropriate for outpatient management.  He will be placed on erythromycin eye ointment, instructed to follow-up with ophthalmology.  Return precautions were discussed at length and he was discharged in a stable condition.             ADDITIONAL PROBLEM LIST    DISPOSITION AND DISCUSSIONS      Decision tools and prescription drugs considered including, but not limited to: Antibiotics   .    FINAL DIAGNOSIS  1. Chemical conjunctivitis of right eye             Electronically signed by: Byron Antonio DO ,3:43 AM 11/01/24

## 2024-11-01 NOTE — Clinical Note
Krishna Rex was seen and treated in our emergency department on 11/1/2024.  He may return to work on 11/02/2024.       If you have any questions or concerns, please don't hesitate to call.      Byron Littlejohn D.O.

## 2024-11-01 NOTE — ED TRIAGE NOTES
Pt ambulated into triage with c/o  splash into right eye approx 2000 last PM. Pt was seen at [facility] and sent here. Pt is A&O and ambulatory. Placed in Lobby pending ER room

## 2024-11-01 NOTE — ED NOTES
Patient provided with discharge instructions. Patient AxO 4 and GCS 15 at time of discharge. Patient is amenable with plan of care after discussing with physician. Patient in no apparent distress and respiring at an even and unlabored rate, with sufficient chest rise and fall bilaterally. The patients vitals are within normal ranges to the patients baseline. The patient has no IV in place at time of discharge. The patient ambulated out of ED with steady gait.